# Patient Record
Sex: FEMALE | Race: WHITE
[De-identification: names, ages, dates, MRNs, and addresses within clinical notes are randomized per-mention and may not be internally consistent; named-entity substitution may affect disease eponyms.]

---

## 2017-01-19 ENCOUNTER — HOSPITAL ENCOUNTER (OUTPATIENT)
Dept: HOSPITAL 80 - BRMIMAGING | Age: 67
End: 2017-01-19
Attending: GENERAL ACUTE CARE HOSPITAL
Payer: COMMERCIAL

## 2017-01-19 DIAGNOSIS — Z80.3: ICD-10-CM

## 2017-01-19 DIAGNOSIS — Z12.31: Primary | ICD-10-CM

## 2017-01-19 PROCEDURE — G0202 SCR MAMMO BI INCL CAD: HCPCS

## 2017-01-19 NOTE — MA
Screening Digital Mammogram With iCAD Analysis



Clinical Indications: Routine screening. Her mother was diagnosed with breast cancer in her 70s. 



Technique: Standard cephalocaudal and mediolateral oblique projections were obtained. This examinatio
n was processed by the iCAD computer aided detection system.



Comparison: January 2016, January 2015, January 2014, December 2012, December 2011, December 2010, No
vember 2009, October 2007.



Breast density: Type B; Scattered fibroglandular densities.



Findings: CAD was reviewed.  No masses, suspicious calcifications or other signs of malignancy are id
entified.  There has been no significant change in the appearance of either breast.



Impression: Negative mammogram. BI-RADS 1. 



Recommendation: Routine mammographic screening in one year.



[facility] will send a result letter to the patient.

Negative mammography should not preclude additional workup of a clinically suspicious finding.

The patient's information is entered into a reminder system with a target due date for her next mammo
gram.

## 2018-01-29 ENCOUNTER — HOSPITAL ENCOUNTER (OUTPATIENT)
Dept: HOSPITAL 80 - BRMIMAGING | Age: 68
End: 2018-01-29
Attending: FAMILY MEDICINE
Payer: COMMERCIAL

## 2018-01-29 DIAGNOSIS — Z80.3: ICD-10-CM

## 2018-01-29 DIAGNOSIS — Z12.31: Primary | ICD-10-CM

## 2018-12-07 ENCOUNTER — HOSPITAL ENCOUNTER (OUTPATIENT)
Dept: HOSPITAL 80 - FSGY | Age: 68
Discharge: HOME | End: 2018-12-07
Attending: INTERNAL MEDICINE
Payer: COMMERCIAL

## 2018-12-07 VITALS — DIASTOLIC BLOOD PRESSURE: 62 MMHG | SYSTOLIC BLOOD PRESSURE: 99 MMHG

## 2018-12-07 DIAGNOSIS — D12.2: Primary | ICD-10-CM

## 2018-12-07 DIAGNOSIS — D12.4: ICD-10-CM

## 2018-12-07 DIAGNOSIS — C67.9: ICD-10-CM

## 2018-12-07 DIAGNOSIS — K57.31: ICD-10-CM

## 2018-12-07 DIAGNOSIS — Z86.010: ICD-10-CM

## 2018-12-07 DIAGNOSIS — J84.9: ICD-10-CM

## 2018-12-07 DIAGNOSIS — Z85.51: ICD-10-CM

## 2018-12-07 DIAGNOSIS — Z87.891: ICD-10-CM

## 2018-12-07 NOTE — PDANEPAE
ANE History of Present Illness





Colonoscopy





ANE Past Medical History





- Cardiovascular History


Hx Hypertension: No


Hx Arrhythmias: No


Hx Chest Pain: No


Hx Coronary Artery / Peripheral Vascular Disease: No


Hx CHF / Valvular Disease: No


Hx Palpitations: No


Cardiovascular History Comment: HPL





- Pulmonary History


Hx COPD: No


Hx Asthma/Reactive Airway Disease: No


Hx Recent Upper Respiratory Infection: No


Hx Oxygen in Use at Home: Yes


O2 in Use at Home (L/minute): O2 2L NOC, 3 L with exercise


Hx Sleep Apnea: No


Sleep Apnea Screening Result - Last Documented: Negative


Pulmonary History Comment: interstial lung disease.  USES O2 NOC





- Neurologic History


Hx Cerebrovascular Accident: No


Hx Seizures: No


Hx Dementia: No





- Endocrine History


Hx Diabetes: No


Hypothyroid: No


Hyperthyroid: No


Obesity: severe


Endocrine History Comment: Hx of snoring





- Renal History


Hx Renal Disorders: Yes


Renal History Comment: BLADDER CA - STARTED WKLY INTRAVESICULAR INFUSIONS





- Liver History


Hx Hepatic Disorders: No





- Neurological & Psychiatric Hx


Hx Neurological and Psychiatric Disorders: No





- Cancer History


Hx Cancer: No





- Congenital Disorder History


Hx Congenital Disorders: No





- GI History


GERD: moderate


Hx Gastrointestinal Disorders: Yes


Gastrointestinal History Comment: gastroperesis: unclear etiology.  silent gerd





- Other Health History


Other Health History: none





- Chronic Pain History


Chronic Pain: No





- Surgical History


Prior Surgeries: ADDITIONAL TISSUE BLADDER REM.  BLADDER TUMOR.  L tka 2018





ANE Review of Systems


Review of Systems: 








- Exercise capacity


METS (RN): 4 METS (with supplemental O2)





ANE Patient History





- Allergies


Allergies/Adverse Reactions: 








ibuprofen [From Advil] Allergy (Verified 08/15/18 16:05)


 Other-Enter Comments








- Home Medications


Home Medications: 








Calcium 500 + Vit D Caplet  08/15/18 [Last Taken 11/30/18]


Claritin  08/15/18 [Last Taken 2 Weeks Ago ~11/23/18]


Lipitor  08/15/18 [Last Taken 12/06/18]


Ocuvite Adult 50 Plus Softgel  08/15/18 [Last Taken 12/06/18]


Omega-3 Fish Oil Softgel  08/15/18 [Last Taken 11/30/18]


Omega-3 Flaxseed Oil  08/15/18 [Last Taken 11/30/18]


Omeprazole  08/15/18 [Last Taken 12/07/18 07:00]


Ranitidine HCl  08/15/18 [Last Taken 12/06/18]


Retaine Mgd Eye Drops  08/15/18 [Last Taken 12/07/18 07:00]


Xiidra  08/15/18 [Last Taken 12/07/18 07:00]


Bcg Live  12/07/18 [Last Taken 12/06/18]








- NPO status


NPO Since - Liquids (Date): 12/07/18


NPO Since - Liquids (Time): 04:00


NPO Since - Solids (Date): 12/06/18


NPO Since - Solids (Time): 09:00





- Anes Hx


Anes Hx: post operative nausea





- Smoking Hx


Smoking Status: Former smoker





- Alcohol Use


Alcohol Use: Rarely





- Family Anes Hx


Family Anes Hx: none


Family Hx Anesthesia Complications: none





ANE Labs/Vital Signs





- Vital Signs


Blood Pressure: 143/72


Heart Rate: 64


Respiratory Rate: 18


O2 Sat (%): 95


Height: 167.64 cm


Weight: 106.05 kg





ANE Physical Exam





- Airway


Neck exam: FROM


Mouth exam: normal dental/mouth exam





- Pulmonary


Pulmonary: clear to auscultation





- Cardiovascular


Cardiovascular: regular rate and rhythym





ANE Anesthesia Plan


Anesthesia Plan: GA with mask

## 2018-12-07 NOTE — GIREPORT
[facility]

Surgical Services - Endoscopy Department

_____________________________________________________________________________________________________
_______

Patient Name: Shantelle Douglas                         Procedure Date: 12/7/2018 11:35 AM

MRN: [id number]                                       Account Number: [id number]

Patient Type: Outpatient                             Attending  MD/ ER Physician: Oriana Shipman MD

_____________________________________________________________________________________________________
_______

 

Procedure:                    Colonoscopy

Indications:                  High risk colon cancer surveillance: Personal history of colonic polyps


Providers:                    Oriana Shipman MD

Medicines:                    Monitored Anesthesia Care

Complications:                No immediate complications.

Description of Procedure:     After obtaining informed consent, the scope was passed under direct vis
ion. 

                              Throughout the procedure, the patient's blood pressure, pulse, and oxyg
en 

                              saturations were monitored continuously. The Colonoscope with irrigatio
n 

                              channel was introduced through the anus and advanced to the cecum, 

                              identified by appendiceal orifice and ileocecal valve. The colonoscopy 
was 

                              performed with difficulty due to looping. Successful completion of the 


                              procedure was aided by applying abdominal pressure and water insufflati
on 

                              with gentle slow advancement. The patient tolerated the procedure well.
 The 

                              quality of the bowel preparation was good. The ileocecal valve, appendi
ceal 

                              orifice, and rectum were photographed.

Findings:                     The perianal and digital rectal examinations were normal.

                              A few diverticula were found in the sigmoid colon.

                              A 4 mm polyp was found in the ascending colon. The polyp was sessile. T
he 

                              polyp was removed with a cold biopsy forceps. Resection and retrieval w
ere 

                              complete. Estimated blood loss was minimal.

                              A 5 mm polyp was found in the descending colon. The polyp was sessile. 
The 

                              polyp was removed with a cold biopsy forceps. Resection and retrieval w
ere 

                              complete. Estimated blood loss was minimal.

Estimated Blood Loss:         Estimated blood loss was minimal.

Post Op Diagnosis:            - Diverticulosis in the sigmoid colon.

                              - One 4 mm polyp in the ascending colon, removed with a cold biopsy for
ceps. 

                              Resected and retrieved.

                              - One 5 mm polyp in the descending colon, removed with a cold biopsy 

                              forceps. Resected and retrieved.

Recommendation:               - Await pathology results.

                              - Patient has a contact number available for emergencies. The signs and
 

                              symptoms of potential delayed complications were discussed with the pat
ient. 

                              Return to normal activities tomorrow. Written discharge instructions we
re 

                              provided to the patient.

                              - High fiber diet.

                              - Continue present medications.

                              - Repeat colonoscopy in 5 years for surveillance.

                              - Discharge patient to home.

                              - Thank you for allowing me to participate in the care of your patient.


Attending Participation:

     I personally performed the entire procedure.

 

Oriana Shipman MD

____________________

Oriana Shipman MD

12/7/2018 12:17:49 PM

This report has been signed electronicallyOriana Shipman MD

Number of Addenda: 0

 

Note Initiated On: 12/7/2018 11:35 AM

Total Procedure Duration Time 0 hours 15 minutes 12 seconds 

[email or web address]

## 2018-12-07 NOTE — PDGENHP
History & Physical


Chief Complaint: h/o colon polyps


History of Present Illness: bladder cancer.  pulm dz


Relevant Physical Exam: cv [id number] nl.  chest CTA.  Abd soft nt


Cardiorespiratory Assessment: asa3

## 2019-01-30 ENCOUNTER — HOSPITAL ENCOUNTER (OUTPATIENT)
Dept: HOSPITAL 80 - BRMIMAGING | Age: 69
End: 2019-01-30
Attending: FAMILY MEDICINE
Payer: COMMERCIAL

## 2019-01-30 DIAGNOSIS — Z80.3: ICD-10-CM

## 2019-01-30 DIAGNOSIS — Z12.31: Primary | ICD-10-CM
